# Patient Record
Sex: FEMALE | Race: WHITE | NOT HISPANIC OR LATINO | URBAN - METROPOLITAN AREA
[De-identification: names, ages, dates, MRNs, and addresses within clinical notes are randomized per-mention and may not be internally consistent; named-entity substitution may affect disease eponyms.]

---

## 2017-05-30 ENCOUNTER — IMPORTED ENCOUNTER (OUTPATIENT)
Dept: URBAN - METROPOLITAN AREA CLINIC 1 | Facility: CLINIC | Age: 72
End: 2017-05-30

## 2017-05-30 PROBLEM — Z96.1: Noted: 2017-05-30

## 2017-05-30 PROBLEM — H18.413: Noted: 2017-05-30

## 2017-05-30 PROBLEM — H35.3133: Noted: 2017-05-30

## 2017-05-30 PROBLEM — H35.412: Noted: 2017-05-30

## 2017-05-30 PROBLEM — H43.813: Noted: 2017-05-30

## 2017-05-30 PROBLEM — H26.493: Noted: 2017-05-30

## 2017-05-30 PROCEDURE — 92014 COMPRE OPH EXAM EST PT 1/>: CPT

## 2017-05-30 NOTE — PATIENT DISCUSSION
1.  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 Importance of daily AREDS II study multivitamin and Amsler Grid checks discussed with patient. Patient to follow-up immediately with any new onset of decreased vision and/or metamorphopsia. 2. Pseudophakia OU w/ PCO OU: (Posterior Capsule Opacification)   Observe and consider yag cap when pt feels pco visually significant and visual acuity decreases to appropriate level. 3. Lannis Keepers. 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 Patient was cautioned to call our office immediately if they experience a substantial change in their symptoms such as an increase in floaters persistent flashes loss of visual field (may appear as a shadow or a curtain) or decrease in visual acuity as these may indicate a retinal tear or detachment. 5.  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 Return for an appointment in 6 months for 10 / dfe / glare with Dr. Duane Benjamin.

## 2017-11-27 ENCOUNTER — IMPORTED ENCOUNTER (OUTPATIENT)
Dept: URBAN - METROPOLITAN AREA CLINIC 1 | Facility: CLINIC | Age: 72
End: 2017-11-27

## 2017-11-27 PROBLEM — H26.493: Noted: 2017-11-27

## 2017-11-27 PROBLEM — H35.412: Noted: 2017-11-27

## 2017-11-27 PROBLEM — H35.3134: Noted: 2017-11-27

## 2017-11-27 PROBLEM — H43.813: Noted: 2017-11-27

## 2017-11-27 PROBLEM — H04.123: Noted: 2017-11-27

## 2017-11-27 PROBLEM — Z96.1: Noted: 2017-11-27

## 2017-11-27 PROCEDURE — 92012 INTRM OPH EXAM EST PATIENT: CPT

## 2017-11-27 PROCEDURE — 92015 DETERMINE REFRACTIVE STATE: CPT

## 2017-11-27 NOTE — PATIENT DISCUSSION
1.  ARMD OU advanced with subfoveal involvement/dry/stable. Importance of daily AREDS II study multivitamin and Amsler Grid checks discussed with patient. Patient to follow-up immediately with any new onset of decreased vision and/or metamorphopsia. 2. PCO OU: Observe and consider yag cap when pt feels pco visually significant and visual acuity decreases to appropriate level. 3. 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 Stable. Observe4. Dry Eyes OU- Stable. The continuation of artificial tears were recommended. 5.  PVD OU- Old stable 6. Pseudophakia OU- Doing well. 7. Return for an appointment for a 30 in 6 months with Dr. Opal Baker.

## 2018-05-23 NOTE — PATIENT DISCUSSION
Patient advised of the right to post-operative care by the surgeon. Patient is fully informed of, and agreed to, co-management with their primary optometric physician. Post-operative care by the surgeon is not medically necessary and co-management is clinically appropriate. Patient has received itemization of fees related to cataract surgery. Transfer of care letter completed for the patient. Transfer care of OD eye to Dr. Awa Norwood on 5/23/18. Patient instructed to call immediately if any new distortion, blurring, decreased vision or eye pain.

## 2018-05-24 NOTE — PROCEDURE NOTE: CLINICAL
PROCEDURE NOTE: A/C wound burp/Paracentesis, Therapeutic OD. Anesthesia: Topical. Prep: Antibiotic Drops/Betadine Flush q 5min x 3. Prior to treatment, the risks/benefits/alternatives were discussed. The patient wished to proceed with procedure. Location of Tap: p.wound @ [facility]. Patient tolerated procedure well. There were no complications. Post-op instructions given. Genaro Shaver

## 2018-05-29 ENCOUNTER — IMPORTED ENCOUNTER (OUTPATIENT)
Dept: URBAN - METROPOLITAN AREA CLINIC 1 | Facility: CLINIC | Age: 73
End: 2018-05-29

## 2018-05-29 PROBLEM — H26.493: Noted: 2018-05-29

## 2018-05-29 PROBLEM — H35.3134: Noted: 2018-05-29

## 2018-05-29 PROCEDURE — 92014 COMPRE OPH EXAM EST PT 1/>: CPT

## 2018-05-29 NOTE — PATIENT DISCUSSION
1.  ARMD OU Advanced with subfoveal involvement/dry/stable. Importance of daily AREDS II study multivitamin and Amsler Grid checks discussed with patient. Patient to follow-up immediately with any new onset of decreased vision and/or metamorphopsia. 2. PCO OU: Observe 3. 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 Dry Eyes OU- Cont ATs TID OU routinely. 5.  PVD OU- Stable RD precautions. 6.  Estrellita Gutiérrez to PCP Return for an appointment in 6 mo 10 dfe with Dr. Nasra Olivares.

## 2018-05-30 NOTE — PATIENT DISCUSSION
Patient advised of the right to post-operative care by the surgeon. Patient is fully informed of, and agreed to, co-management with their primary optometric physician. Post-operative care by the surgeon is not medically necessary and co-management is clinically appropriate. Patient has received itemization of fees related to cataract surgery. Transfer of care letter completed for the patient. Transfer care of OS eye to Dr. Annmarie Jaramillo on 5/30/18. Patient instructed to call immediately if any new distortion, blurring, decreased vision or eye pain.

## 2018-11-19 ENCOUNTER — IMPORTED ENCOUNTER (OUTPATIENT)
Dept: URBAN - METROPOLITAN AREA CLINIC 1 | Facility: CLINIC | Age: 73
End: 2018-11-19

## 2018-11-19 PROBLEM — Z96.1: Noted: 2018-11-19

## 2018-11-19 PROBLEM — H43.813: Noted: 2018-11-19

## 2018-11-19 PROBLEM — H26.493: Noted: 2018-11-19

## 2018-11-19 PROBLEM — H35.412: Noted: 2018-11-19

## 2018-11-19 PROBLEM — H35.3134: Noted: 2018-11-19

## 2018-11-19 PROCEDURE — 92015 DETERMINE REFRACTIVE STATE: CPT

## 2018-11-19 PROCEDURE — 92012 INTRM OPH EXAM EST PATIENT: CPT

## 2018-11-19 NOTE — PATIENT DISCUSSION
1.  PCO OU- Visually Significant *secondary to glare*; schedule YAG Cap. Pros cons risks and benefits. 2.  ARMD OU advanced with subfoveal involvement/dry/stable. Importance of daily AREDS II study multivitamin and Amsler Grid checks discussed with patient. Patient to follow-up immediately with any new onset of decreased vision and/or metamorphopsia. 3. 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 Stable. 4. PVD OU- Old stable. 5.  Pseudophakia OU- Doing well. 6. Return for an appointment for 1-6 weeks Yag Cap OD then OS with Dr. Miles Vela.

## 2018-11-19 NOTE — PATIENT DISCUSSION
ARMD OU advanced with subfoveal involvement/dry/stable. Importance of daily AREDS II study multivitamin and Amsler Grid checks discussed with patient. Patient to follow-up immediately with any new onset of decreased vision and/or metamorphopsia.

## 2018-11-19 NOTE — PATIENT DISCUSSION
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 Patient was cautioned to call our office immediately if they experience a substantial change in their symptoms such as an increase in floaters persistent flashes loss of visual field (may appear as a shadow or a curtain) or decrease in visual acuity as these may indicate a retinal tear or detachment.

## 2018-12-21 ENCOUNTER — IMPORTED ENCOUNTER (OUTPATIENT)
Dept: URBAN - METROPOLITAN AREA CLINIC 1 | Facility: CLINIC | Age: 73
End: 2018-12-21

## 2018-12-21 PROBLEM — H26.491: Noted: 2018-12-21

## 2018-12-21 PROCEDURE — 66821 AFTER CATARACT LASER SURGERY: CPT

## 2018-12-21 NOTE — PATIENT DISCUSSION
YAG CAP OD: (Consent signed and scanned into attachments) 1 gtt Prolensa applied. The purpose and nature of the procedure possible alternative methods of treatment the risks involved and the possibility of complications were discussed with patient. The Patient wishes to proceed and the consent was signed. The laser was then performed under topical anesthesia with no complications. Post op instructions were given to patient as well as a follow-up appointment. Patient was advised to call our office if any questions or concerns. Prolensa QD OD (sample given use until gone)Return for an appointment for Return as scheduled YAG Cap OS with Dr. Joey Bowser.

## 2019-01-04 ENCOUNTER — IMPORTED ENCOUNTER (OUTPATIENT)
Dept: URBAN - METROPOLITAN AREA CLINIC 1 | Facility: CLINIC | Age: 74
End: 2019-01-04

## 2019-01-04 PROBLEM — H26.492: Noted: 2019-01-04

## 2019-01-04 PROCEDURE — 66821 AFTER CATARACT LASER SURGERY: CPT

## 2019-01-04 NOTE — PATIENT DISCUSSION
YAG CAP OS: (Consent signed and scanned into attachments) 1 gtt Prolensa applied. The purpose and nature of the procedure possible alternative methods of treatment the risks involved and the possibility of complications were discussed with patient. The Patient wishes to proceed and the consent was signed. The laser was then performed under topical anesthesia with no complications. Post op instructions were given to patient as well as a follow-up appointment. Patient was advised to call our office if any questions or concerns. ok to D/c Prolensa OD Begin Prolensa Qdaily OS.  RTC 1-2 mo YAG PO

## 2019-03-04 ENCOUNTER — IMPORTED ENCOUNTER (OUTPATIENT)
Dept: URBAN - METROPOLITAN AREA CLINIC 1 | Facility: CLINIC | Age: 74
End: 2019-03-04

## 2019-03-04 PROCEDURE — 99024 POSTOP FOLLOW-UP VISIT: CPT

## 2019-03-04 NOTE — PATIENT DISCUSSION
PO YAG Cap OU: good result. MRX given to patient today @ [facility]. Return for an appointment in 6 MO for a 30 OU with Dr. Meggan Soto.

## 2019-09-09 ENCOUNTER — IMPORTED ENCOUNTER (OUTPATIENT)
Dept: URBAN - METROPOLITAN AREA CLINIC 1 | Facility: CLINIC | Age: 74
End: 2019-09-09

## 2019-09-09 PROBLEM — Z96.1: Noted: 2019-09-09

## 2019-09-09 PROBLEM — H04.123: Noted: 2019-09-09

## 2019-09-09 PROBLEM — H35.3134: Noted: 2019-09-09

## 2019-09-09 PROBLEM — H43.813: Noted: 2019-09-09

## 2019-09-09 PROBLEM — H16.143: Noted: 2019-09-09

## 2019-09-09 PROBLEM — H35.412: Noted: 2019-09-09

## 2019-09-09 PROCEDURE — 92014 COMPRE OPH EXAM EST PT 1/>: CPT

## 2019-09-09 NOTE — PATIENT DISCUSSION
1.  ARMD OU advanced with subfoveal involvement/dry/stable. Importance of daily AREDS II study multivitamin and Amsler Grid checks discussed with patient. Patient to follow-up immediately with any new onset of decreased vision and/or metamorphopsia. 2. DESMOND w/ PEK OU- Recommend ATs TID-QID OU routinely 3. Pseudophakia OU - (Standard OU) H/o YAG Cap OU 4. 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 Patient was cautioned to call our office immediately if they experience a substantial change in their symptoms such as an increase in floaters persistent flashes loss of visual field (may appear as a shadow or a curtain) or decrease in visual acuity as these may indicate a retinal tear or detachment. 5.  PVD OU - RD precautions. Patient deferred Manifest Rx today. Return for an appointment in 6 months 10/DFE with Dr. Israel Bunn.

## 2020-03-12 ENCOUNTER — IMPORTED ENCOUNTER (OUTPATIENT)
Dept: URBAN - METROPOLITAN AREA CLINIC 1 | Facility: CLINIC | Age: 75
End: 2020-03-12

## 2020-03-12 PROBLEM — H04.123: Noted: 2020-03-12

## 2020-03-12 PROBLEM — H35.412: Noted: 2020-03-12

## 2020-03-12 PROBLEM — H35.3134: Noted: 2020-03-12

## 2020-03-12 PROBLEM — H16.143: Noted: 2020-03-12

## 2020-03-12 PROCEDURE — 92015 DETERMINE REFRACTIVE STATE: CPT

## 2020-03-12 PROCEDURE — 92012 INTRM OPH EXAM EST PATIENT: CPT

## 2020-03-12 NOTE — PATIENT DISCUSSION
1.  ARMD OU advanced with subfoveal involvement/dry/stable. Importance of daily AREDS II study multivitamin and Amsler Grid checks discussed with patient. Patient to follow-up immediately with any new onset of decreased vision and/or metamorphopsia. 2. DESMOND w/ PEK OU -- Recommend ATs TID-QID OU routinely 3. Pseudophakia OU -- (Standard OU) H/o YAG Cap OU 4. 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 Patient was cautioned to call our office immediately if they experience a substantial change in their symptoms such as an increase in floaters persistent flashes loss of visual field (may appear as a shadow or a curtain) or decrease in visual acuity as these may indicate a retinal tear or detachment. 5.  PVD OU -- RD precautions. Finalized Glasses MRx todayReturn for an appointment in 6 months 27 with Dr. Benitez Sanchez.

## 2020-09-17 ENCOUNTER — IMPORTED ENCOUNTER (OUTPATIENT)
Dept: URBAN - METROPOLITAN AREA CLINIC 1 | Facility: CLINIC | Age: 75
End: 2020-09-17

## 2020-09-17 PROBLEM — H35.412: Noted: 2020-09-17

## 2020-09-17 PROBLEM — H35.3134: Noted: 2020-09-17

## 2020-09-17 PROBLEM — H04.123: Noted: 2020-09-17

## 2020-09-17 PROBLEM — H16.143: Noted: 2020-09-17

## 2020-09-17 PROCEDURE — 92014 COMPRE OPH EXAM EST PT 1/>: CPT

## 2020-09-17 NOTE — PATIENT DISCUSSION
1.  ARMD OU advanced with subfoveal involvement/dry/stable. Importance of daily AREDS II study multivitamin and Amsler Grid checks discussed with patient. Patient to follow-up immediately with any new onset of decreased vision and/or metamorphopsia. 2. DESMOND w/ PEK OU -- Recommend ATs TID-QID OU routinely 3. 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 RD precautions. 4.  Pseudophakia OU -- (Standard OU) H/o YAG Cap OU 5. PVD OU -- RD precautions. Patient defers MRx today. Return for an appointment in 6 months 10/dfe with Dr. Opal Baker.

## 2021-05-24 ENCOUNTER — IMPORTED ENCOUNTER (OUTPATIENT)
Dept: URBAN - METROPOLITAN AREA CLINIC 1 | Facility: CLINIC | Age: 76
End: 2021-05-24

## 2021-05-24 PROBLEM — H35.3134: Noted: 2021-05-24

## 2021-05-24 PROBLEM — H16.143: Noted: 2021-05-24

## 2021-05-24 PROBLEM — H04.123: Noted: 2021-05-24

## 2021-05-24 PROCEDURE — 99214 OFFICE O/P EST MOD 30 MIN: CPT

## 2021-05-24 NOTE — PATIENT DISCUSSION
1.  ARMD OU advanced with subfoveal involvement/dry/stable. Importance of daily AREDS II study multivitamin and Amsler Grid checks discussed with patient. Patient to follow-up immediately with any new onset of decreased vision and/or metamorphopsia. 2. DESMOND w/ PEK OU -- Recommend ATs TID-QID OU routinely. Compliance urged. 3.  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 RD precautions. 4.  Pseudophakia OU -- (Standard OU) H/o YAG Cap OU. 5.  PVD OU -- Old Stable. RD precautions. Return for an appointment in 6 months 30/Mac Photo with Dr. Duane Benjamin.

## 2021-11-29 ENCOUNTER — IMPORTED ENCOUNTER (OUTPATIENT)
Dept: URBAN - METROPOLITAN AREA CLINIC 1 | Facility: CLINIC | Age: 76
End: 2021-11-29

## 2021-11-29 PROBLEM — H35.3134: Noted: 2021-11-29

## 2021-11-29 PROCEDURE — 92250 FUNDUS PHOTOGRAPHY W/I&R: CPT

## 2021-11-29 PROCEDURE — 99214 OFFICE O/P EST MOD 30 MIN: CPT

## 2021-11-29 NOTE — PATIENT DISCUSSION
1.  ARMD OU advanced with subfoveal involvement/dry/stable. Mac Photo today showing [place] OU. Importance of daily AREDS II study multivitamin and Amsler Grid checks discussed with patient. Patient to follow-up immediately with any new onset of decreased vision and/or metamorphopsia. 2. DESMOND w/ PEK OU -- Recommend ATs TID-QID OU routinely. Compliance urged. 3.  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 RD precautions. 4.  Pseudophakia OU -- (Standard OU) H/o YAG Cap OU. 5.  PVD OU -- Old Stable. RD precautions. Return for an appointment in 6 months DFE/MAC OCT with Dr. Charlie Costa.

## 2022-04-02 ASSESSMENT — VISUAL ACUITY
OS_CC: 20/30
OS_CC: 20/40
OU_CC: 20/20
OD_CC: 20/25
OS_CC: 20/20
OS_GLARE: 20/60
OD_CC: 20/25
OS_CC: 20/25-1
OD_SC: 20/20
OS_CC: 20/25
OS_CC: 20/40
OD_CC: 20/25
OS_CC: 20/30
OS_CC: 20/30
OD_CC: 20/20
OD_CC: 20/25
OD_GLARE: 20/50
OS_GLARE: 20/60
OD_CC: 20/30
OD_GLARE: 20/60
OS_CC: J2
OD_SC: 20/20
OD_CC: J1
OS_SC: 20/30
OD_CC: 20/30+2
OS_SC: 20/30
OD_CC: 20/20
OD_GLARE: 20/60
OS_GLARE: 20/50
OS_CC: 20/20-2
OD_CC: 20/20-1

## 2022-04-02 ASSESSMENT — TONOMETRY
OD_IOP_MMHG: 12
OS_IOP_MMHG: 13
OD_IOP_MMHG: 13
OD_IOP_MMHG: 12
OS_IOP_MMHG: 12
OD_IOP_MMHG: 12
OD_IOP_MMHG: 11
OS_IOP_MMHG: 12
OD_IOP_MMHG: 14
OS_IOP_MMHG: 14
OD_IOP_MMHG: 12
OS_IOP_MMHG: 13
OS_IOP_MMHG: 12
OS_IOP_MMHG: 12
OS_IOP_MMHG: 14
OS_IOP_MMHG: 12
OD_IOP_MMHG: 13
OD_IOP_MMHG: 12
OD_IOP_MMHG: 12
OS_IOP_MMHG: 15

## 2022-06-10 ENCOUNTER — COMPREHENSIVE EXAM (OUTPATIENT)
Dept: URBAN - METROPOLITAN AREA CLINIC 1 | Facility: CLINIC | Age: 77
End: 2022-06-10

## 2022-06-10 DIAGNOSIS — H43.813: ICD-10-CM

## 2022-06-10 DIAGNOSIS — H04.123: ICD-10-CM

## 2022-06-10 DIAGNOSIS — H35.3134: ICD-10-CM

## 2022-06-10 DIAGNOSIS — H16.143: ICD-10-CM

## 2022-06-10 DIAGNOSIS — H35.412: ICD-10-CM

## 2022-06-10 PROCEDURE — 92014 COMPRE OPH EXAM EST PT 1/>: CPT

## 2022-06-10 PROCEDURE — 92134 CPTRZ OPH DX IMG PST SGM RTA: CPT

## 2022-06-10 ASSESSMENT — TONOMETRY
OS_IOP_MMHG: 08
OD_IOP_MMHG: 07

## 2022-06-10 ASSESSMENT — VISUAL ACUITY
OS_SC: 20/25
OD_SC: 20/20-1

## 2022-12-12 ENCOUNTER — COMPREHENSIVE EXAM (OUTPATIENT)
Dept: URBAN - METROPOLITAN AREA CLINIC 1 | Facility: CLINIC | Age: 77
End: 2022-12-12

## 2022-12-12 PROCEDURE — 99214 OFFICE O/P EST MOD 30 MIN: CPT

## 2022-12-12 ASSESSMENT — TONOMETRY
OD_IOP_MMHG: 10
OS_IOP_MMHG: 10

## 2022-12-12 ASSESSMENT — VISUAL ACUITY
OD_SC: J3
OS_SC: J3
OD_SC: 20/20
OS_SC: 20/25

## 2022-12-12 NOTE — PATIENT DISCUSSION
Stable during today's visit. Discussed AREDS supplements, BP Control, and dark leafy green vegetables.

## 2023-06-19 ENCOUNTER — FOLLOW UP (OUTPATIENT)
Dept: URBAN - METROPOLITAN AREA CLINIC 1 | Facility: CLINIC | Age: 78
End: 2023-06-19

## 2023-06-19 DIAGNOSIS — H04.123: ICD-10-CM

## 2023-06-19 DIAGNOSIS — H16.143: ICD-10-CM

## 2023-06-19 DIAGNOSIS — H35.412: ICD-10-CM

## 2023-06-19 DIAGNOSIS — H35.3134: ICD-10-CM

## 2023-06-19 PROCEDURE — 99213 OFFICE O/P EST LOW 20 MIN: CPT

## 2023-06-19 PROCEDURE — 92134 CPTRZ OPH DX IMG PST SGM RTA: CPT

## 2023-06-19 ASSESSMENT — TONOMETRY
OS_IOP_MMHG: 11
OD_IOP_MMHG: 11

## 2023-06-19 ASSESSMENT — VISUAL ACUITY
OD_SC: J3
OS_SC: J3
OS_SC: 20/20-2
OD_SC: 20/20

## 2024-01-29 ENCOUNTER — COMPREHENSIVE EXAM (OUTPATIENT)
Dept: URBAN - METROPOLITAN AREA CLINIC 1 | Facility: CLINIC | Age: 79
End: 2024-01-29

## 2024-01-29 DIAGNOSIS — H16.143: ICD-10-CM

## 2024-01-29 DIAGNOSIS — H04.123: ICD-10-CM

## 2024-01-29 DIAGNOSIS — Z96.1: ICD-10-CM

## 2024-01-29 DIAGNOSIS — H35.412: ICD-10-CM

## 2024-01-29 DIAGNOSIS — H43.813: ICD-10-CM

## 2024-01-29 DIAGNOSIS — H35.3134: ICD-10-CM

## 2024-01-29 PROCEDURE — 99214 OFFICE O/P EST MOD 30 MIN: CPT

## 2024-01-29 ASSESSMENT — VISUAL ACUITY
OD_SC: 20/20-1
OD_BAT: 20/30
OS_BAT: 20/30
OS_SC: 20/40-1
OS_SC: J5
OD_SC: J7

## 2024-01-29 ASSESSMENT — TONOMETRY
OD_IOP_MMHG: 09
OS_IOP_MMHG: 10

## 2024-08-01 ENCOUNTER — FOLLOW UP (OUTPATIENT)
Dept: URBAN - METROPOLITAN AREA CLINIC 1 | Facility: CLINIC | Age: 79
End: 2024-08-01

## 2024-08-01 DIAGNOSIS — H35.3134: ICD-10-CM

## 2024-08-01 PROCEDURE — 92134 CPTRZ OPH DX IMG PST SGM RTA: CPT

## 2024-08-01 PROCEDURE — 99213 OFFICE O/P EST LOW 20 MIN: CPT

## 2024-08-01 ASSESSMENT — VISUAL ACUITY
OS_SC: 20/25
OD_SC: 20/20

## 2024-08-01 ASSESSMENT — TONOMETRY
OD_IOP_MMHG: 10
OS_IOP_MMHG: 10

## 2025-02-04 ENCOUNTER — COMPREHENSIVE EXAM (OUTPATIENT)
Age: 80
End: 2025-02-04

## 2025-02-04 DIAGNOSIS — H35.412: ICD-10-CM

## 2025-02-04 DIAGNOSIS — H35.3134: ICD-10-CM

## 2025-02-04 DIAGNOSIS — H16.143: ICD-10-CM

## 2025-02-04 DIAGNOSIS — Z96.1: ICD-10-CM

## 2025-02-04 DIAGNOSIS — H04.123: ICD-10-CM

## 2025-02-04 DIAGNOSIS — H43.813: ICD-10-CM

## 2025-02-04 PROCEDURE — 99214 OFFICE O/P EST MOD 30 MIN: CPT

## 2025-02-04 PROCEDURE — 92134 CPTRZ OPH DX IMG PST SGM RTA: CPT

## 2025-08-07 ENCOUNTER — FOLLOW UP (OUTPATIENT)
Age: 80
End: 2025-08-07

## 2025-08-07 DIAGNOSIS — H35.3134: ICD-10-CM

## 2025-08-07 PROCEDURE — 92134 CPTRZ OPH DX IMG PST SGM RTA: CPT

## 2025-08-07 PROCEDURE — 99213 OFFICE O/P EST LOW 20 MIN: CPT
